# Patient Record
Sex: FEMALE | Race: WHITE | NOT HISPANIC OR LATINO | Employment: OTHER | URBAN - METROPOLITAN AREA
[De-identification: names, ages, dates, MRNs, and addresses within clinical notes are randomized per-mention and may not be internally consistent; named-entity substitution may affect disease eponyms.]

---

## 2018-11-07 ENCOUNTER — HOSPITAL ENCOUNTER (OUTPATIENT)
Facility: CLINIC | Age: 73
Discharge: HOME | End: 2018-11-07
Attending: COLON & RECTAL SURGERY | Admitting: COLON & RECTAL SURGERY
Payer: MEDICARE

## 2018-11-07 ENCOUNTER — SURGERY (OUTPATIENT)
Age: 73
End: 2018-11-07

## 2018-11-07 VITALS
DIASTOLIC BLOOD PRESSURE: 79 MMHG | SYSTOLIC BLOOD PRESSURE: 138 MMHG | HEIGHT: 66 IN | RESPIRATION RATE: 31 BRPM | BODY MASS INDEX: 23.3 KG/M2 | OXYGEN SATURATION: 95 % | WEIGHT: 145 LBS

## 2018-11-07 PROBLEM — F32.A DEPRESSIVE DISORDER: Status: ACTIVE | Noted: 2018-11-07

## 2018-11-07 PROBLEM — M47.817 LUMBOSACRAL SPONDYLOSIS WITHOUT MYELOPATHY: Status: ACTIVE | Noted: 2018-11-07

## 2018-11-07 PROBLEM — I10 ESSENTIAL HYPERTENSION: Status: ACTIVE | Noted: 2018-11-07

## 2018-11-07 PROBLEM — I42.1 HOCM (HYPERTROPHIC OBSTRUCTIVE CARDIOMYOPATHY) (H): Status: ACTIVE | Noted: 2017-02-10

## 2018-11-07 PROBLEM — E78.5 HYPERLIPIDEMIA: Status: ACTIVE | Noted: 2018-11-07

## 2018-11-07 PROBLEM — G47.33 OSA (OBSTRUCTIVE SLEEP APNEA): Status: ACTIVE | Noted: 2017-10-16

## 2018-11-07 PROBLEM — I05.9 MITRAL VALVE DISEASE: Status: ACTIVE | Noted: 2018-11-07

## 2018-11-07 PROBLEM — R94.31 ABNORMAL HOLTER MONITOR FINDING: Status: ACTIVE | Noted: 2017-03-27

## 2018-11-07 LAB — COLONOSCOPY: NORMAL

## 2018-11-07 PROCEDURE — G0105 COLORECTAL SCRN; HI RISK IND: HCPCS | Performed by: COLON & RECTAL SURGERY

## 2018-11-07 PROCEDURE — G0500 MOD SEDAT ENDO SERVICE >5YRS: HCPCS | Performed by: COLON & RECTAL SURGERY

## 2018-11-07 PROCEDURE — 99153 MOD SED SAME PHYS/QHP EA: CPT

## 2018-11-07 PROCEDURE — 25000128 H RX IP 250 OP 636: Performed by: COLON & RECTAL SURGERY

## 2018-11-07 PROCEDURE — 45378 DIAGNOSTIC COLONOSCOPY: CPT | Performed by: COLON & RECTAL SURGERY

## 2018-11-07 RX ORDER — FENTANYL CITRATE 50 UG/ML
INJECTION, SOLUTION INTRAMUSCULAR; INTRAVENOUS PRN
Status: DISCONTINUED | OUTPATIENT
Start: 2018-11-07 | End: 2018-11-07 | Stop reason: HOSPADM

## 2018-11-07 RX ORDER — TOLTERODINE TARTRATE 1 MG/1
1 TABLET, EXTENDED RELEASE ORAL 2 TIMES DAILY
COMMUNITY

## 2018-11-07 RX ORDER — IRBESARTAN AND HYDROCHLOROTHIAZIDE 300; 12.5 MG/1; MG/1
1 TABLET, FILM COATED ORAL DAILY
COMMUNITY

## 2018-11-07 RX ORDER — LIDOCAINE 40 MG/G
CREAM TOPICAL
Status: DISCONTINUED | OUTPATIENT
Start: 2018-11-07 | End: 2018-11-07 | Stop reason: HOSPADM

## 2018-11-07 RX ORDER — ONDANSETRON 2 MG/ML
4 INJECTION INTRAMUSCULAR; INTRAVENOUS
Status: DISCONTINUED | OUTPATIENT
Start: 2018-11-07 | End: 2018-11-07 | Stop reason: HOSPADM

## 2018-11-07 RX ORDER — ONDANSETRON 4 MG/1
4 TABLET, ORALLY DISINTEGRATING ORAL EVERY 6 HOURS PRN
Status: DISCONTINUED | OUTPATIENT
Start: 2018-11-07 | End: 2018-11-07 | Stop reason: HOSPADM

## 2018-11-07 RX ORDER — NALOXONE HYDROCHLORIDE 0.4 MG/ML
.1-.4 INJECTION, SOLUTION INTRAMUSCULAR; INTRAVENOUS; SUBCUTANEOUS
Status: DISCONTINUED | OUTPATIENT
Start: 2018-11-07 | End: 2018-11-07 | Stop reason: HOSPADM

## 2018-11-07 RX ORDER — ONDANSETRON 2 MG/ML
4 INJECTION INTRAMUSCULAR; INTRAVENOUS EVERY 6 HOURS PRN
Status: DISCONTINUED | OUTPATIENT
Start: 2018-11-07 | End: 2018-11-07 | Stop reason: HOSPADM

## 2018-11-07 RX ORDER — FLUMAZENIL 0.1 MG/ML
0.2 INJECTION, SOLUTION INTRAVENOUS
Status: DISCONTINUED | OUTPATIENT
Start: 2018-11-07 | End: 2018-11-07 | Stop reason: HOSPADM

## 2018-11-07 RX ADMIN — MIDAZOLAM 2 MG: 1 INJECTION INTRAMUSCULAR; INTRAVENOUS at 11:51

## 2018-11-07 RX ADMIN — FENTANYL CITRATE 100 MCG: 50 INJECTION, SOLUTION INTRAMUSCULAR; INTRAVENOUS at 11:51

## 2018-11-07 NOTE — BRIEF OP NOTE
[facility] Brief Operative Note    Pre-operative diagnosis: PERSONAL HX POLYPS   Post-operative diagnosis diverticulosis     Procedure: Procedure(s):  COLONOSCOPY   Surgeon(s): Surgeon(s) and Role:     * Ramonita Zazueta MD - Primary   Estimated blood loss: * No values recorded between 11/7/2018 12:00 AM and 11/7/2018 12:25 PM *    Specimens: * No specimens in log *   Findings: See Provation procedure note in Epic

## 2018-11-07 NOTE — H&P
Pre-Endoscopy History and Physical     Lo Mckay MRN# [id number]   YOB: 1945 Age: 73 year old     Date of Procedure: 11/7/2018  Primary care provider: Noel Sr  Type of Endoscopy: colonoscopy  Reason for Procedure: screening, history of polyps  Type of Anesthesia Anticipated: moderate sedation    HPI:    Lo is a 73 year old female who will be undergoing the above procedure.  Patient denies a change in her bowel habits or bleeding. She has had a few episodes of accidental bowel leakage and does wear a pad for urinary leakage.  Patient has had a tubular adenoma in the past.     A history and physical has been performed. The patient's medications and allergies have been reviewed. The risks and benefits of the procedure and the sedation options and risks were discussed with the patient.  All questions were answered and informed consent was obtained.      She denies a personal or family history of anesthesia complications or bleeding disorders.   Prior to Admission medications    Medication Sig Start Date End Date Taking? Authorizing Provider   ASPIRIN PO Take 81 mg by mouth daily    Yes Reported, Patient   BUPROPION HCL PO Take 150 mg by mouth daily   Yes Reported, Patient   calcium-vitamin D (CALTRATE) 600-400 MG-UNIT per tablet Take 1 tablet by mouth 2 times daily   Yes Reported, Patient   Celecoxib (CELEBREX PO)    Yes Reported, Patient   CLONIDINE HCL PO Take 0.1 mg by mouth 2 times daily   Yes Reported, Patient   ezetimibe-simvastatin (VYTORIN) 10-40 MG per tablet Take 1 tablet by mouth At Bedtime   Yes Reported, Patient   irbesartan-hydrochlorothiazide (AVALIDE) 300-12.5 MG per tablet Take 1 tablet by mouth daily   Yes Reported, Patient   NIFEDIPINE PO Take 60 mg by mouth daily   Yes Reported, Patient   potassium chloride (KLOR-CON) 20 MEQ packet Take 20 mEq by mouth 2 times daily   Yes Reported, Patient   tolterodine (DETROL) 1 MG tablet Take 1 mg by mouth 2 times daily   Yes  Reported, Patient   valsartan-hydrochlorothiazide (DIOVAN-HCT) 320-25 MG per tablet Take 1 tablet by mouth daily   Yes Reported, Patient   VITAMIN D, CHOLECALCIFEROL, PO Take 1,000 Units by mouth daily   Yes Reported, Patient   HYDROcodone-acetaminophen (NORCO) 5-325 MG per tablet Take 1-2 tablets by mouth every 4 hours as needed for other (Moderate to Severe Pain) 10/26/15   Red Mcneill MD       No Known Allergies     Current Facility-Administered Medications   Medication     lidocaine (LMX4) cream     lidocaine 1 % 1 mL     ondansetron (ZOFRAN) injection 4 mg     sodium chloride (PF) 0.9% PF flush 3 mL     sodium chloride (PF) 0.9% PF flush 3 mL       Patient Active Problem List   Diagnosis     Breast asymmetry between native breast and reconstructed breast     Breast cancer, right breast (H)     Essential hypertension     Status post total shoulder arthroplasty     Screening for osteoporosis     Hyperlipidemia     Osteopenia     CHRISTINA (obstructive sleep apnea)     Mitral valve disease     Lumbosacral spondylosis without myelopathy     HOCM (hypertrophic obstructive cardiomyopathy) (H)     Hip arthritis     DJD (degenerative joint disease) of knee     Depressive disorder     Colon cancer screening     Backache     Abnormal Holter monitor finding        Past Medical History:   Diagnosis Date     Arthritis     osteoarthritis     Cancer (H)     breast cancer     Heart murmur     mitral valve prolapse     High cholesterol      Hypertension      Sleep apnea         Past Surgical History:   Procedure Laterality Date     BACK SURGERY       BREAST SURGERY      right mastectomy     ENT SURGERY      tonsillectomy     GYN SURGERY      hysterectomy     ORTHOPEDIC SURGERY Left     knee     ORTHOPEDIC SURGERY Right     hip     RECONSTRUCT BREAST Right 7/10/2015    Procedure: RECONSTRUCT BREAST;  Surgeon: Simon Buenrostro MD;  Location: [facility]     RECONSTRUCT BREAST SECOND STAGE Right 10/26/2015    Procedure:  "RECONSTRUCT BREAST SECOND STAGE;  Surgeon: Simon Buenrostro MD;  Location: [facility] OR     REMOVE IMPLANT BREAST Right 7/10/2015    Procedure: REMOVE IMPLANT BREAST;  Surgeon: Simon Buenrostro MD;  Location:  OR       Social History   Substance Use Topics     Smoking status: Former Smoker     Years: 30.00     Types: Cigarettes     Quit date: 7/10/2011     Smokeless tobacco: Never Used     Alcohol use No       Family History   Problem Relation Age of Onset     Breast Cancer Mother      Multiple Sclerosis Father        REVIEW OF SYSTEMS:     5 point ROS negative except as noted above in HPI, including Gen., Resp., CV, GI &  system review. Some joint discomfort      PHYSICAL EXAM:   /82  Resp 16  Ht 1.676 m (5' 6\")  Wt 65.8 kg (145 lb)  SpO2 96%  BMI 23.4 kg/m2 Estimated body mass index is 23.4 kg/(m^2) as calculated from the following:    Height as of this encounter: 1.676 m (5' 6\").    Weight as of this encounter: 65.8 kg (145 lb).   GENERAL APPEARANCE: healthy  MENTAL STATUS: alert  AIRWAY EXAM: Mallampatti Class II (visualization of the soft palate, fauces, and uvula)  RESP: lungs clear to auscultation - no rales, rhonchi or wheezes  CV: regular rates and rhythm      DIAGNOSTICS:    Not indicated      IMPRESSION   ASA Class 2 - Mild systemic disease        PLAN:       Colonoscopy with possible polypectomy, possible biopsy. The indications, procedure and risks were explained to the patient who agrees to proceed.       The above has been forwarded to the consulting provider.      Signed Electronically by: Ramonita Zazueta  November 7, 2018          "

## 2022-10-06 ENCOUNTER — TRANSCRIBE ORDERS (OUTPATIENT)
Dept: OTHER | Age: 77
End: 2022-10-06

## 2022-10-06 DIAGNOSIS — H91.90 HEARING LOSS, UNSPECIFIED HEARING LOSS TYPE, UNSPECIFIED LATERALITY: Primary | ICD-10-CM

## 2022-12-18 NOTE — PROGRESS NOTES
AUDIOLOGY REPORT    SUBJECTIVE:  Lo Mckay is a 77 year old female who was seen on 12/19/22 in the Audiology Clinic at the [facility] for audiologic evaluation, referred by Noel Sr M.D.  The patient was accompanied today by her , who waited in the lobby.     The patient indicates she is uncertain if she has hearing loss.  She denies ear pain, drainage from the ears, aural fullness, tinnitus, dizziness, or history of ear surgery.  She has been exposed to loud music in the past.      OBJECTIVE:    Otoscopic exam indicates a large amount of cerumen in both ears.  This was removed without incident using lighted curette before testing.      Pure Tone Thresholds assessed using conventional audiometry with good reliability from 250-8000 Hz bilaterally using insert earphones and circumaural headphones.     RIGHT: Normal hearing through 2000 Hz, sloping to to a mild primarily sensorineural hearing loss at 3000 Hz and above.      LEFT: Normal hearing/borderline normal hearing through 1000 Hz, sloping to a a mild to moderate primarily sensorineural hearing loss at 2000 Hz and above (15 dB air bone gap at 250 Hz).      *Negative Shy at 2000 Hz; 15-20 dB asymmetry at 2000, 6000, & 8000 Hz where left ear is worse than right.      Tympanogram:    RIGHT: normal eardrum mobility    LEFT: normal eardrum mobility    Reflexes (reported by stimulus ear):  RIGHT: Ipsilateral is present at normal levels.  RIGHT: Contralateral is present at elevated levels.  LEFT:   Ipsilateral is present at elevated levels.  LEFT:   Contralateral is present at normal levels.     Speech Reception Threshold:    RIGHT: 20 dB HL    LEFT:   20 dB HL  Word Recognition Score:     RIGHT: 96% at 60 dB HL using NU-6 recorded word list.    LEFT:   100% at 65 dB HL using NU-6 recorded word list.      ASSESSMENT:   Sensorineural hearing loss bilaterally, asymmetric     Today s results were  discussed with the patient in detail.     PLAN:    1. ENT consultation due to asymmetrical hearing loss.  2. Recheck hearing in 1 year or sooner if advised by ENT.  3. Borderline candidate for hearing aid trial if medical clearance from ENT.  Patient indicates that she is not interested in a hearing aid trial at this time.     The patient expressed understanding and agreement with this plan.    Yamileth Kruger M.A.  Audiology Doctoral Extern  [place] #[id number]    I was present with the patient for the entire Audiology appointment including all procedures/testing performed by the AuD student, and agree with the student s assessment and plan as documented.    Tee Wade, CCC-A  Licensed Audiologist  [place] #[id number]    Enclosure: audiogram

## 2022-12-19 ENCOUNTER — OFFICE VISIT (OUTPATIENT)
Dept: AUDIOLOGY | Facility: CLINIC | Age: 77
End: 2022-12-19
Payer: COMMERCIAL

## 2022-12-19 DIAGNOSIS — H90.3 ASYMMETRICAL SENSORINEURAL HEARING LOSS: Primary | ICD-10-CM

## 2022-12-19 DIAGNOSIS — H91.90 HEARING LOSS, UNSPECIFIED HEARING LOSS TYPE, UNSPECIFIED LATERALITY: ICD-10-CM

## 2022-12-19 PROCEDURE — 92557 COMPREHENSIVE HEARING TEST: CPT | Performed by: AUDIOLOGIST-HEARING AID FITTER

## 2022-12-19 PROCEDURE — 92565 STENGER TEST PURE TONE: CPT | Performed by: AUDIOLOGIST-HEARING AID FITTER

## 2022-12-19 PROCEDURE — 92550 TYMPANOMETRY & REFLEX THRESH: CPT | Performed by: AUDIOLOGIST-HEARING AID FITTER

## 2022-12-19 NOTE — TELEPHONE ENCOUNTER
FUTURE VISIT INFORMATION      FUTURE VISIT INFORMATION:    Date: 1/25/23    Time: 2:10pm    Location: [facility]  REFERRAL INFORMATION:    Referring provider:      Referring providers clinic:      Reason for visit/diagnosis  asymmetrical hearing loss    audio 12/19/22    RECORDS REQUESTED FROM:       Clinic name Comments Records Status Imaging Status   [facility] audiology 12/19/22- note with Alessandra Black AuD  12/19/22- audiogram  Epic     [facility]  9/10/22- CT head  [facility]  12/19/22- pending req  -PACS                              December 19, 2022 2:44 PM - Faxed a request to [facility] to push Image to [place] PACS- Fabiana   January 9, 2023 8:48 AM - Received image in pacs and attached it to patient- Fabiana

## 2023-01-25 ENCOUNTER — PRE VISIT (OUTPATIENT)
Dept: OTOLARYNGOLOGY | Facility: CLINIC | Age: 78
End: 2023-01-25

## 2023-01-25 ENCOUNTER — OFFICE VISIT (OUTPATIENT)
Dept: OTOLARYNGOLOGY | Facility: CLINIC | Age: 78
End: 2023-01-25
Payer: COMMERCIAL

## 2023-01-25 VITALS — BODY MASS INDEX: 24.05 KG/M2 | WEIGHT: 149 LBS | HEART RATE: 74 BPM | TEMPERATURE: 98.3 F | OXYGEN SATURATION: 95 %

## 2023-01-25 DIAGNOSIS — H90.3 ASYMMETRICAL SENSORINEURAL HEARING LOSS: ICD-10-CM

## 2023-01-25 DIAGNOSIS — H90.3 BILATERAL SENSORINEURAL HEARING LOSS: Primary | ICD-10-CM

## 2023-01-25 PROCEDURE — 99203 OFFICE O/P NEW LOW 30 MIN: CPT | Performed by: REGISTERED NURSE

## 2023-01-25 ASSESSMENT — PAIN SCALES - GENERAL: PAINLEVEL: NO PAIN (0)

## 2023-01-25 NOTE — LETTER
1/25/2023       RE: Lo Mckay  [address] [facility] [address]     Dear Colleague,    Thank you for referring your patient, Lo Mckay, to the [facility] EAR NOSE AND THROAT CLINIC [place] [facility]. Please see a copy of my visit note below.      Otolaryngology Clinic  January 25, 2023    Chief Complaint:   Bilateral hearing loss       History of Present Illness:   Lo Mckay is a 77 year old female who presents today for review of recent audiogram. Patient recently completed an audiogram to obtain a baseline result. Does not feel that they have any hearing loss or difficulty with conversation. Reports loud noise exposure history attending [employer]. Grandmother with hearing loss.     Patient denies any otalgia, otorrhea, dizziness, tinnitus, facial numbness/weakness, history of frequent ear infections, or ear surgeries.       Past Medical History:  Past Medical History:   Diagnosis Date     Arthritis     osteoarthritis     Cancer (H)     breast cancer     Heart murmur     mitral valve prolapse     High cholesterol      Hypertension      Sleep apnea        Past Surgical History:  Past Surgical History:   Procedure Laterality Date     BACK SURGERY       BREAST SURGERY      right mastectomy     COLONOSCOPY N/A 11/7/2018    Procedure: COLONOSCOPY;  Surgeon: Ramonita Zazueta MD;  Location:  GI     ENT SURGERY      tonsillectomy     GYN SURGERY      hysterectomy     ORTHOPEDIC SURGERY Left     knee     ORTHOPEDIC SURGERY Right     hip     RECONSTRUCT BREAST Right 7/10/2015    Procedure: RECONSTRUCT BREAST;  Surgeon: Simon Buenrostro MD;  Location:  OR     RECONSTRUCT BREAST SECOND STAGE Right 10/26/2015    Procedure: RECONSTRUCT BREAST SECOND STAGE;  Surgeon: Simon Buenrostro MD;  Location: [facility] OR     REMOVE IMPLANT BREAST Right 7/10/2015    Procedure: REMOVE IMPLANT BREAST;  Surgeon: Porter  Simon William MD;  Location:  OR       Medications:  Current Outpatient Medications   Medication Sig Dispense Refill     ASPIRIN PO Take 81 mg by mouth daily        BUPROPION HCL PO Take 150 mg by mouth daily       calcium-vitamin D (CALTRATE) 600-400 MG-UNIT per tablet Take 1 tablet by mouth 2 times daily       Celecoxib (CELEBREX PO)        CLONIDINE HCL PO Take 0.1 mg by mouth 2 times daily       ezetimibe-simvastatin (VYTORIN) 10-40 MG per tablet Take 1 tablet by mouth At Bedtime       irbesartan-hydrochlorothiazide (AVALIDE) 300-12.5 MG per tablet Take 1 tablet by mouth daily       NIFEDIPINE PO Take 60 mg by mouth daily       potassium chloride (KLOR-CON) 20 MEQ packet Take 20 mEq by mouth 2 times daily       tolterodine (DETROL) 1 MG tablet Take 1 mg by mouth 2 times daily       valsartan-hydrochlorothiazide (DIOVAN-HCT) 320-25 MG per tablet Take 1 tablet by mouth daily       VITAMIN D, CHOLECALCIFEROL, PO Take 1,000 Units by mouth daily         Allergies:  No Known Allergies     Social History:  Social History     Tobacco Use     Smoking status: Former     Years: 30.00     Types: Cigarettes     Quit date: 7/10/2011     Years since quittin.5     Smokeless tobacco: Never   Substance Use Topics     Alcohol use: No     Drug use: No       ROS: 10 point ROS neg other than the symptoms noted above in the HPI.    Physical Exam:    Pulse 74   Temp 98.3  F (36.8  C)   Wt 67.6 kg (149 lb)   SpO2 95%   BMI 24.05 kg/m       Constitutional:  The patient was unaccompanied, well-groomed, and in no acute distress.     Neurologic: Alert and oriented x 3.  CN's III-XII within normal limits.  Voice normal.   Psychiatric: The patient's affect was calm, cooperative, and appropriate.    Communication:  Normal; communicates verbally, normal voice quality.    Respiratory: Breathing comfortably without stridor or exertion of accessory muscles.   Ears: Pinnae and tragus non-tender.  EAC's and TM's were clear.        Audiogram: 12/19/2023 - data independently reviewed  Right ear: Normal sloping to mild SNHL   Left ear: Normal sloping to moderate SNHL   WR right: 96% at 60 dB left: 100% at 65 dB  Acoustic Reflexes: Present in all conditions  Tympanograms: type A bilaterally            Assessment and Plan:  1. Bilateral sensorineural hearing loss  Patient with bilateral sensorineural hearing loss. Reviewed audiogram with patient today. Patient is a borderline hearing aid candidate.  Patient is not bothered by hearing loss at this time.     Recommend follow up in 1 year with audiogram.    Yuki Camarena DNP, APRN, CNP  Otolaryngology  Head & Neck Surgery  [phone]    30 minutes spent on the date of the encounter doing chart review, history and exam, documentation and further activities per the note        Again, thank you for allowing me to participate in the care of your patient.      Sincerely,    Chika Camarena, NP

## 2023-01-25 NOTE — PROGRESS NOTES
Otolaryngology Clinic  January 25, 2023    Chief Complaint:   Bilateral hearing loss       History of Present Illness:   Lo Mckay is a 77 year old female who presents today for review of recent audiogram. Patient recently completed an audiogram to obtain a baseline result. Does not feel that they have any hearing loss or difficulty with conversation. Reports loud noise exposure history attending loud concerts. Grandmother with hearing loss.     Patient denies any otalgia, otorrhea, dizziness, tinnitus, facial numbness/weakness, history of frequent ear infections, or ear surgeries.       Past Medical History:  Past Medical History:   Diagnosis Date     Arthritis     osteoarthritis     Cancer (H)     breast cancer     Heart murmur     mitral valve prolapse     High cholesterol      Hypertension      Sleep apnea        Past Surgical History:  Past Surgical History:   Procedure Laterality Date     BACK SURGERY       BREAST SURGERY      right mastectomy     COLONOSCOPY N/A 11/7/2018    Procedure: COLONOSCOPY;  Surgeon: Ramonita Zazueta MD;  Location:  GI     ENT SURGERY      tonsillectomy     GYN SURGERY      hysterectomy     ORTHOPEDIC SURGERY Left     knee     ORTHOPEDIC SURGERY Right     hip     RECONSTRUCT BREAST Right 7/10/2015    Procedure: RECONSTRUCT BREAST;  Surgeon: Simon Buenrostro MD;  Location: [facility] OR     RECONSTRUCT BREAST SECOND STAGE Right 10/26/2015    Procedure: RECONSTRUCT BREAST SECOND STAGE;  Surgeon: Simon Buenrostro MD;  Location: [facility] OR     REMOVE IMPLANT BREAST Right 7/10/2015    Procedure: REMOVE IMPLANT BREAST;  Surgeon: Simon Buenrostro MD;  Location: [facility] OR       Medications:  Current Outpatient Medications   Medication Sig Dispense Refill     ASPIRIN PO Take 81 mg by mouth daily        BUPROPION HCL PO Take 150 mg by mouth daily       calcium-vitamin D (CALTRATE) 600-400 MG-UNIT per tablet Take 1 tablet by mouth 2 times daily       Celecoxib  (CELEBREX PO)        CLONIDINE HCL PO Take 0.1 mg by mouth 2 times daily       ezetimibe-simvastatin (VYTORIN) 10-40 MG per tablet Take 1 tablet by mouth At Bedtime       irbesartan-hydrochlorothiazide (AVALIDE) 300-12.5 MG per tablet Take 1 tablet by mouth daily       NIFEDIPINE PO Take 60 mg by mouth daily       potassium chloride (KLOR-CON) 20 MEQ packet Take 20 mEq by mouth 2 times daily       tolterodine (DETROL) 1 MG tablet Take 1 mg by mouth 2 times daily       valsartan-hydrochlorothiazide (DIOVAN-HCT) 320-25 MG per tablet Take 1 tablet by mouth daily       VITAMIN D, CHOLECALCIFEROL, PO Take 1,000 Units by mouth daily         Allergies:  No Known Allergies     Social History:  Social History     Tobacco Use     Smoking status: Former     Years: 30.00     Types: Cigarettes     Quit date: 7/10/2011     Years since quittin.5     Smokeless tobacco: Never   Substance Use Topics     Alcohol use: No     Drug use: No       ROS: 10 point ROS neg other than the symptoms noted above in the HPI.    Physical Exam:    Pulse 74   Temp 98.3  F (36.8  C)   Wt 67.6 kg (149 lb)   SpO2 95%   BMI 24.05 kg/m       Constitutional:  The patient was unaccompanied, well-groomed, and in no acute distress.     Neurologic: Alert and oriented x 3.  CN's III-XII within normal limits.  Voice normal.   Psychiatric: The patient's affect was calm, cooperative, and appropriate.    Communication:  Normal; communicates verbally, normal voice quality.    Respiratory: Breathing comfortably without stridor or exertion of accessory muscles.   Ears: Pinnae and tragus non-tender.  EAC's and TM's were clear.       Audiogram: 2023 - data independently reviewed  Right ear: Normal sloping to mild SNHL   Left ear: Normal sloping to moderate SNHL   WR right: 96% at 60 dB left: 100% at 65 dB  Acoustic Reflexes: Present in all conditions  Tympanograms: type A bilaterally            Assessment and Plan:  1. Bilateral sensorineural hearing  loss  Patient with bilateral sensorineural hearing loss. Reviewed audiogram with patient today. Patient is a borderline hearing aid candidate.  Patient is not bothered by hearing loss at this time.     Recommend follow up in 1 year with audiogram.    Yuki Camarena DNP, APRN, CNP  Otolaryngology  Head & Neck Surgery  [phone]    30 minutes spent on the date of the encounter doing chart review, history and exam, documentation and further activities per the note

## 2023-01-25 NOTE — NURSING NOTE
Chief Complaint   Patient presents with     Consult     Asymmetrical hearing loss      Pulse 74, temperature 98.3  F (36.8  C), weight 67.6 kg (149 lb), SpO2 95 %.    Robert Bridges LPN

## 2024-07-30 ENCOUNTER — OFFICE VISIT (OUTPATIENT)
Dept: AUDIOLOGY | Facility: CLINIC | Age: 79
End: 2024-07-30
Payer: COMMERCIAL

## 2024-07-30 DIAGNOSIS — H90.3 ASYMMETRICAL SENSORINEURAL HEARING LOSS: Primary | ICD-10-CM

## 2024-07-30 PROCEDURE — 92550 TYMPANOMETRY & REFLEX THRESH: CPT | Performed by: AUDIOLOGIST

## 2024-07-30 PROCEDURE — 92557 COMPREHENSIVE HEARING TEST: CPT | Performed by: AUDIOLOGIST

## 2024-07-30 NOTE — PROGRESS NOTES
AUDIOLOGY REPORT    SUBJECTIVE:  Lo Mckay is a 78 year old female who was seen in the Audiology Clinic at the [facility] for audiologic evaluation, referred by self. The patient has been seen previously in this clinic on 12/19/2022 for assessment and results indicated normal hearing through 2000 Hz sloping to mild primarily sensorineural hearing loss in the right ear and normal/borderline normal hearing through 1000 Hz sloping to mild to moderate primarily sensorineural hearing loss at 2000 Hz and 15 air-bone gap at 250 Hz as well as asymmetries of 15 to 20 dB at 2, 6, 8 k Hz in the left ear. The patient reports no hearing changes since the last hearing test. She denies  bilateral tinnitus, bilateral otalgia, and bilateral drainage. They were accompanied today by their .     OBJECTIVE:  Abuse Screening:  Do you feel unsafe at home or work/school? No  Do you feel threatened by someone? No  Does anyone try to keep you from having contact with others, or doing things outside of your home? No  Physical signs of abuse present? No     Fall Risk Screen:  1. Have you fallen two or more times in the past year? No  2. Have you fallen and had an injury in the past year? No    Timed Up and Go Score (in seconds): not tested  Is patient a fall risk? No  Referral initiated: No  Fall Risk Assessment Completed by Audiology    Otoscopic exam indicates non-occluding cerumen bilaterally which was able to be removed using a lighted curette without incident.    Pure Tone Thresholds assessed using conventional audiometry with good  reliability from 250-8000 Hz bilaterally using insert earphones and circumaural headphones     RIGHT:  normal sloping to moderate sensorineural hearing loss    LEFT:    normal sloping to moderately-severe sensorineural hearing loss with a 15 dB asymmetry at 2 kHz and 10 to 20 dB asymmetries from 4 to 8 k Hz and 15 dB air-bone gap at 250  Hz.    Tympanogram:    RIGHT: normal eardrum mobility    LEFT:   normal eardrum mobility    Reflexes (reported by stimulus ear):  RIGHT: Ipsilateral is absent at frequencies tested  RIGHT: Contralateral is absent at frequencies tested  LEFT:   Ipsilateral is absent at frequencies tested  LEFT:   Contralateral is absent at frequencies tested      Speech Reception Threshold:    RIGHT: 15 dB HL    LEFT:   15 dB HL    Word Recognition Score:     RIGHT: 100% at 65 dB HL using NU-6 recorded word list.    LEFT:   96% at 65 dB HL using NU-6 recorded word list.      ASSESSMENT: Today's results reveal asymmetric sensorineural hearing loss bilaterally, worse in the left ear. Compared to patient's previous audiogram dated 12/19/2022, hearing has remained stable in both ears. Today s results were discussed with the patient in detail.       PLAN:  Patient was counseled regarding hearing loss and impact on communication. Patient is a borderline candidate for amplification at this time. It is recommended that the patient check with her insurance for hearing aid benefits. If the patient would like to pursue hearing aids, she may return to this clinic for her hearing aid consultation. The patient is provided a copy of her audiogram. Please call this clinic with questions regarding these results or recommendations.    DANIEL AdamsA  Audiology Doctoral Extern  [id number] #[id number]    I was present with the patient for the entire Audiology appointment, including all procedures/testing performed by the Tee student, and agree with the student s assessment and plan as documented.     Tee Mario, CCC-A  Clinical Audiologist  [id number] #[id number]

## (undated) RX ORDER — FENTANYL CITRATE 50 UG/ML
INJECTION, SOLUTION INTRAMUSCULAR; INTRAVENOUS
Status: DISPENSED
Start: 2018-11-07